# Patient Record
Sex: FEMALE | Race: WHITE | NOT HISPANIC OR LATINO | Employment: FULL TIME | URBAN - NONMETROPOLITAN AREA
[De-identification: names, ages, dates, MRNs, and addresses within clinical notes are randomized per-mention and may not be internally consistent; named-entity substitution may affect disease eponyms.]

---

## 2017-06-15 ENCOUNTER — OFFICE VISIT (OUTPATIENT)
Dept: CARDIAC SURGERY | Facility: CLINIC | Age: 52
End: 2017-06-15

## 2017-06-15 DIAGNOSIS — Z90.2 S/P LOBECTOMY OF LUNG: Primary | ICD-10-CM

## 2017-06-15 PROCEDURE — 99212 OFFICE O/P EST SF 10 MIN: CPT | Performed by: THORACIC SURGERY (CARDIOTHORACIC VASCULAR SURGERY)

## 2017-06-16 VITALS
OXYGEN SATURATION: 98 % | BODY MASS INDEX: 25.11 KG/M2 | SYSTOLIC BLOOD PRESSURE: 132 MMHG | DIASTOLIC BLOOD PRESSURE: 94 MMHG | HEART RATE: 79 BPM | WEIGHT: 160 LBS | HEIGHT: 67 IN

## 2017-06-16 NOTE — PROGRESS NOTES
06/15/2017  Patient Information  Dania Montero                                                                                          [address] [place] [place] [address]   1965  'PCP/Referring Physician'  Celine Cano,   [phone]  No ref. provider found    Chief Complaint   Patient presents with   • Follow-up     6mo f/u  w/ CXR       History of Present Illness:  Mrs. Montero returns today for follow up of her lung cancer.  Since last visit she has continued to see Dr. Echols.  She denies any new problems. She denies any pulmonary symptoms. She denies any weight loss or hemoptysis.    Patient Active Problem List   Diagnosis   • B-cell lymphoma   • Dyslipidemia   • Emphysema/COPD   • Dyspnea   • Lung mass   • GERD (gastroesophageal reflux disease)   • S/P lobectomy of lung     Past Medical History:   Diagnosis Date   • Dyslipidemia 9/28/2016   • Dyspnea 9/28/2016   • Emphysema/COPD 9/28/2016   • GERD (gastroesophageal reflux disease)    • History of bronchoscopy 05/20/2015    The RUL, bronchus intermedius, RLL, superior segment of the RLL and RML revealed no endobronchial lesions and minimal clear secretions. The SHER, lingula, LLL and superior segment of the LLL again revealed no endobronchial lesions and minimal clear secretions.   • History of chest x-ray 02/01/2016    Stable oblong opacity and atelectasis in the anterior aspect of the left perihilar region.   • History of chest x-ray 12/21/2015    Cardiac silhouette is within normal limits in size. Ellipsoid density still present in the lingular area, maybe slightly larger than seen on the prior film. Otherwise no significant change in the radiographic appearance of the chest.   • History of chest x-ray 11/25/2015    Cardiac silhouette is within normal limits in size. Right lung is clear. There is an ellipsoid density in the left midlung just lateral to, and below, the left hilum. In comparison to our prior film of 10/12/2015, this  area has improved in size although still present   • History of echocardiogram 05/05/2016    The estimated EF is 60-65%.  Mild to moderate TR. The RVSP is calculated at 44 mmHg.   • History of PFTs 05/12/2015    Moderate airway obstruction. NSC after BD. Normal diffusion.   • History of PFTs 03/24/2015    FEV1 is 55% predicted. Low vital capacity possibly due to restriction of lung volumes.   • Lung mass 9/28/2016     Past Surgical History:   Procedure Laterality Date   • BRONCHOSCOPY     • THORACOTOMY Left 05/03/2016    left thoracotomy,left upper lobectomy,lymph node sampling       Current Outpatient Prescriptions:   •  aspirin 81 MG EC tablet, Take 81 mg by mouth daily., Disp: , Rfl:   •  Calcium Carbonate Antacid (RA ANTACID PO), Take 1 tablet by mouth as needed., Disp: , Rfl:   •  Calcium Citrate-Vitamin D (CALCIUM + D PO), Take  by mouth., Disp: , Rfl:   •  metoprolol tartrate (LOPRESSOR) 25 MG tablet, Take 25 mg by mouth 2 (two) times a day., Disp: , Rfl:   •  pantoprazole (PROTONIX) 40 MG EC tablet, Take 1 tablet by mouth daily., Disp: 60 tablet, Rfl: 3  No Known Allergies  Social History     Social History   • Marital status:      Spouse name: N/A   • Number of children: 2   • Years of education: N/A     Occupational History   •       Social History Main Topics   • Smoking status: Former Smoker     Packs/day: 1.00     Years: 30.00     Types: Cigarettes     Quit date: 05/2000   • Smokeless tobacco: Never Used   • Alcohol use No   • Drug use: No   • Sexual activity: Not on file     Other Topics Concern   • Not on file     Social History Narrative     Family History   Problem Relation Age of Onset   • Other Mother      dyslipidemia   • Hypertension Mother    • Other Father      Dyslipidemia   • Emphysema Father    • Hypertension Father    • Heart defect Son    • Breast cancer Paternal Aunt    • Colon cancer Paternal Aunt    • Lung cancer Maternal Grandmother    • Lung cancer Maternal  "Grandfather      Review of Systems   Constitution: Negative for chills, fever, malaise/fatigue, night sweats and weight loss.   HENT: Negative for headaches, hearing loss, odynophagia and sore throat.    Cardiovascular: Negative for chest pain, dyspnea on exertion, leg swelling, orthopnea and palpitations.   Respiratory: Negative for cough and hemoptysis.    Endocrine: Positive for cold intolerance. Negative for heat intolerance, polydipsia, polyphagia and polyuria.   Hematologic/Lymphatic: Bruises/bleeds easily.   Skin: Negative for itching and rash.   Musculoskeletal: Negative for joint pain, joint swelling and myalgias.   Gastrointestinal: Negative for abdominal pain, constipation, diarrhea, hematemesis, hematochezia, melena, nausea and vomiting.   Genitourinary: Negative for dysuria, frequency and hematuria.   Neurological: Negative for focal weakness, numbness and seizures.   Psychiatric/Behavioral: Negative for suicidal ideas.   All other systems reviewed and are negative.    Vitals:    06/16/17 0901   BP: 132/94   BP Location: Left arm   Patient Position: Sitting   Pulse: 79   SpO2: 98%   Weight: 160 lb (72.6 kg)   Height: 67\" (170.2 cm)      Physical Exam  NECK:    No masses.  LUNGS:   Clear.  CARDIOVASCULAR:  Regular rhythm and rate.    Labs/Imaging:  Her chest x-ray looks satisfactory.    Assessment/Plan:  Mrs. Montero appears to be doing excellent in all regards.  We will get a CT scan in six months and then see her back in follow up.      Patient Active Problem List   Diagnosis   • B-cell lymphoma   • Dyslipidemia   • Emphysema/COPD   • Dyspnea   • Lung mass   • GERD (gastroesophageal reflux disease)   • S/P lobectomy of lung     Signed by: Vitor Kendall M.D.    6/15/2017    CC:  DO Jenifer Wyatt transcribing on behalf of Vitor Kendall MD dictating.   "

## 2017-07-07 RX ORDER — ASPIRIN 81 MG/1
TABLET ORAL
Qty: 90 TABLET | Refills: 3 | Status: SHIPPED | OUTPATIENT
Start: 2017-07-07

## 2017-12-04 ENCOUNTER — TELEPHONE (OUTPATIENT)
Dept: CARDIAC SURGERY | Facility: CLINIC | Age: 52
End: 2017-12-04

## 2018-02-15 ENCOUNTER — OFFICE VISIT (OUTPATIENT)
Dept: CARDIAC SURGERY | Facility: CLINIC | Age: 53
End: 2018-02-15

## 2018-02-15 DIAGNOSIS — Z90.2 S/P LOBECTOMY OF LUNG: Primary | ICD-10-CM

## 2018-02-15 PROCEDURE — 99212 OFFICE O/P EST SF 10 MIN: CPT | Performed by: THORACIC SURGERY (CARDIOTHORACIC VASCULAR SURGERY)

## 2018-02-16 VITALS
WEIGHT: 165 LBS | HEIGHT: 67 IN | SYSTOLIC BLOOD PRESSURE: 120 MMHG | HEART RATE: 78 BPM | BODY MASS INDEX: 25.9 KG/M2 | DIASTOLIC BLOOD PRESSURE: 90 MMHG

## 2018-02-16 NOTE — PROGRESS NOTES
02/15/2018  Patient Information  Dania Montero                                                                                          [address] [place]  [place] [address]   1965  'PCP/Referring Physician'  Celine Cano,   [phone]  No ref. provider found    Chief Complaint   Patient presents with   • Lung Cancer     6 month follow-up with CT chest       History of Present Illness:  Mrs. Montero returns today for follow up of her lung cancer.  She has been doing well since last visit.  She is seeing Dr. Echols on a regular basis.  She will be having a CT scan in April.  She denies cough, hemoptysis or other pulmonary symptoms.    Patient Active Problem List   Diagnosis   • B-cell lymphoma   • Dyslipidemia   • Emphysema/COPD   • Dyspnea   • Lung mass   • GERD (gastroesophageal reflux disease)   • S/P lobectomy of lung     Past Medical History:   Diagnosis Date   • Dyslipidemia 9/28/2016   • Dyspnea 9/28/2016   • Emphysema/COPD 9/28/2016   • GERD (gastroesophageal reflux disease)    • History of bronchoscopy 05/20/2015    The RUL, bronchus intermedius, RLL, superior segment of the RLL and RML revealed no endobronchial lesions and minimal clear secretions. The SHER, lingula, LLL and superior segment of the LLL again revealed no endobronchial lesions and minimal clear secretions.   • History of chest x-ray 02/01/2016    Stable oblong opacity and atelectasis in the anterior aspect of the left perihilar region.   • History of chest x-ray 12/21/2015    Cardiac silhouette is within normal limits in size. Ellipsoid density still present in the lingular area, maybe slightly larger than seen on the prior film. Otherwise no significant change in the radiographic appearance of the chest.   • History of chest x-ray 11/25/2015    Cardiac silhouette is within normal limits in size. Right lung is clear. There is an ellipsoid density in the left midlung just lateral to, and below, the left hilum. In  comparison to our prior film of 10/12/2015, this area has improved in size although still present   • History of echocardiogram 05/05/2016    The estimated EF is 60-65%.  Mild to moderate TR. The RVSP is calculated at 44 mmHg.   • History of PFTs 05/12/2015    Moderate airway obstruction. NSC after BD. Normal diffusion.   • History of PFTs 03/24/2015    FEV1 is 55% predicted. Low vital capacity possibly due to restriction of lung volumes.   • Lung mass 9/28/2016     Past Surgical History:   Procedure Laterality Date   • BRONCHOSCOPY     • THORACOTOMY Left 05/03/2016    left thoracotomy,left upper lobectomy,lymph node sampling       Current Outpatient Prescriptions:   •  aspirin 81 MG EC tablet, TAKE 1 TABLET BY MOUTH DAILY, Disp: 90 tablet, Rfl: 3  •  Calcium Citrate-Vitamin D (CALCIUM + D PO), Take  by mouth., Disp: , Rfl:   •  metoprolol tartrate (LOPRESSOR) 25 MG tablet, Take 25 mg by mouth 2 (two) times a day., Disp: , Rfl:   •  pantoprazole (PROTONIX) 40 MG EC tablet, Take 1 tablet by mouth daily., Disp: 60 tablet, Rfl: 3  •  Calcium Carbonate Antacid (RA ANTACID PO), Take 1 tablet by mouth as needed., Disp: , Rfl:   No Known Allergies  Social History     Social History   • Marital status:      Spouse name: N/A   • Number of children: 2   • Years of education: N/A     Occupational History   •       Social History Main Topics   • Smoking status: Former Smoker     Packs/day: 1.00     Years: 30.00     Types: Cigarettes     Quit date: 05/2000   • Smokeless tobacco: Never Used   • Alcohol use No   • Drug use: No   • Sexual activity: Not on file     Other Topics Concern   • Not on file     Social History Narrative     Family History   Problem Relation Age of Onset   • Other Mother      dyslipidemia   • Hypertension Mother    • Other Father      Dyslipidemia   • Emphysema Father    • Hypertension Father    • Heart defect Son    • Breast cancer Paternal Aunt    • Colon cancer Paternal Aunt    • Lung  "cancer Maternal Grandmother    • Lung cancer Maternal Grandfather      Review of Systems   Constitution: Negative for chills, fever, malaise/fatigue, night sweats and weight loss.   HENT: Negative for hearing loss, odynophagia and sore throat.    Cardiovascular: Positive for palpitations. Negative for chest pain, dyspnea on exertion, leg swelling and orthopnea.   Respiratory: Negative for cough and hemoptysis.    Endocrine: Negative for cold intolerance, heat intolerance, polydipsia, polyphagia and polyuria.   Hematologic/Lymphatic: Does not bruise/bleed easily.   Skin: Negative for itching and rash.   Musculoskeletal: Positive for joint pain and muscle cramps. Negative for joint swelling and myalgias.   Gastrointestinal: Negative for abdominal pain, constipation, diarrhea, hematemesis, hematochezia, melena, nausea and vomiting.   Genitourinary: Negative for dysuria, frequency and hematuria.   Neurological: Positive for light-headedness. Negative for focal weakness, headaches, numbness and seizures.   Psychiatric/Behavioral: Negative for suicidal ideas.   All other systems reviewed and are negative.    Vitals:    02/16/18 0909   BP: 120/90   BP Location: Left arm   Patient Position: Sitting   Pulse: 78   Weight: 74.8 kg (165 lb)   Height: 170.2 cm (67\")      Physical Exam  NECK:  No masses.  LUNGS:  Decreased in the bases without rales or rhonchi.  CARDIOVASCULAR:  Regular rhythm and rate without murmur, rub or gallop.  There are no carotid bruits.     Assessment/Plan:  I obtained and reviewed her x-rays.  She overall appears to be doing well in all regards with no evidence of recurrence.  I will see her back in one year since she is seeing Dr. Echols on a regular basis.      Patient Active Problem List   Diagnosis   • B-cell lymphoma   • Dyslipidemia   • Emphysema/COPD   • Dyspnea   • Lung mass   • GERD (gastroesophageal reflux disease)   • S/P lobectomy of lung     Signed by: Vitor Kendall, " M.D.    2/15/2018    CC:  DO Jenifer Wyatt transcribing on behalf of Vitor eKndall MD dictating.

## 2021-10-04 ENCOUNTER — OFFICE VISIT (OUTPATIENT)
Dept: CARDIAC SURGERY | Facility: CLINIC | Age: 56
End: 2021-10-04

## 2021-10-04 VITALS
WEIGHT: 134 LBS | HEART RATE: 75 BPM | DIASTOLIC BLOOD PRESSURE: 74 MMHG | TEMPERATURE: 98.1 F | OXYGEN SATURATION: 98 % | HEIGHT: 67 IN | BODY MASS INDEX: 21.03 KG/M2 | SYSTOLIC BLOOD PRESSURE: 124 MMHG

## 2021-10-04 DIAGNOSIS — C34.90 MALIGNANT NEOPLASM OF BRONCHUS AND LUNG (HCC): Primary | ICD-10-CM

## 2021-10-04 PROCEDURE — 99204 OFFICE O/P NEW MOD 45 MIN: CPT | Performed by: THORACIC SURGERY (CARDIOTHORACIC VASCULAR SURGERY)

## 2021-10-04 NOTE — PROGRESS NOTES
10/04/2021  Patient Information  Dania [address] [place] [address]   1965  'PCP/Referring Physician'  Celine Cano DO [facility]  [phone]  No ref. provider found    Chief Complaint   Patient presents with   • Follow-up     [facility] pt of yours last seen in 2018, referred back by Dr. Val Verma, of the same dx, lung cancer. Pt states that she is having SOB w/ exertion, some fatigue and tenderness still occurs at the left breast from hx of surgery.  pt states that she is nervous today.        History of Present Illness:   The patient is a 56-year-old female who has been referred back for lung cancer. She has a history in 2016, of having a lobectomy for bronchogenic carcinoma. She has, recently, had a chest x-ray which was slightly abnormal.  She has been scheduled for a CT scan.  She has been followed by Dr. Echols on a yearly basis.  She denies any significant weight loss.  She denies hemoptysis or pulmonary symptoms.      Patient Active Problem List   Diagnosis   • B-cell lymphoma (HCC)   • Dyslipidemia   • Emphysema/COPD (HCC)   • Dyspnea   • Lung mass   • GERD (gastroesophageal reflux disease)   • S/P lobectomy of lung   • Malignant neoplasm of bronchus and lung (HCC)     Past Medical History:   Diagnosis Date   • Anxiety    • Cancer (HCC)    • Dyslipidemia 9/28/2016   • Dyspnea 9/28/2016   • Emphysema/COPD (HCC) 9/28/2016   • GERD (gastroesophageal reflux disease)    • History of bronchoscopy 05/20/2015    The RUL, bronchus intermedius, RLL, superior segment of the RLL and RML revealed no endobronchial lesions and minimal clear secretions. The SHER, lingula, LLL and superior segment of the LLL again revealed no endobronchial lesions and minimal clear secretions.   • History of chest x-ray 02/01/2016    Stable oblong opacity and atelectasis in the anterior aspect of the left perihilar region.    • History of chest x-ray 12/21/2015    Cardiac silhouette is within normal limits in size. Ellipsoid density still present in the lingular area, maybe slightly larger than seen on the prior film. Otherwise no significant change in the radiographic appearance of the chest.   • History of chest x-ray 11/25/2015    Cardiac silhouette is within normal limits in size. Right lung is clear. There is an ellipsoid density in the left midlung just lateral to, and below, the left hilum. In comparison to our prior film of 10/12/2015, this area has improved in size although still present   • History of echocardiogram 05/05/2016    The estimated EF is 60-65%.  Mild to moderate TR. The RVSP is calculated at 44 mmHg.   • History of PFTs 05/12/2015    Moderate airway obstruction. NSC after BD. Normal diffusion.   • History of PFTs 03/24/2015    FEV1 is 55% predicted. Low vital capacity possibly due to restriction of lung volumes.   • Lung mass 9/28/2016     Past Surgical History:   Procedure Laterality Date   • BRONCHOSCOPY     • THORACOTOMY Left 05/03/2016    left thoracotomy,left upper lobectomy,lymph node sampling       Current Outpatient Medications:   •  aspirin 81 MG EC tablet, TAKE 1 TABLET BY MOUTH DAILY, Disp: 90 tablet, Rfl: 3  •  metoprolol tartrate (LOPRESSOR) 25 MG tablet, Take 25 mg by mouth 2 (two) times a day., Disp: , Rfl:   •  pantoprazole (PROTONIX) 40 MG EC tablet, Take 1 tablet by mouth daily., Disp: 60 tablet, Rfl: 3  •  vitamin D3 125 MCG (5000 UT) capsule capsule, Take 5,000 Units by mouth Daily., Disp: , Rfl:   •  Calcium Carbonate Antacid (RA ANTACID PO), Take 1 tablet by mouth as needed., Disp: , Rfl:   •  Calcium Citrate-Vitamin D (CALCIUM + D PO), Take  by mouth., Disp: , Rfl:   No Known Allergies  Social History     Socioeconomic History   • Marital status:      Spouse name: Not on file   • Number of children: 2   • Years of education: Not on file   • Highest education level: Not on file    Tobacco Use   • Smoking status: Former Smoker     Packs/day: 1.00     Years: 30.00     Pack years: 30.00     Types: Cigarettes     Quit date: 2016     Years since quittin.4   • Smokeless tobacco: Never Used   Vaping Use   • Vaping Use: Never used   Substance and Sexual Activity   • Alcohol use: No   • Drug use: No   • Sexual activity: Defer     Family History   Problem Relation Age of Onset   • Other Mother         dyslipidemia   • Hypertension Mother    • Other Father         Dyslipidemia   • Emphysema Father    • Hypertension Father    • Heart defect Son    • Breast cancer Paternal Aunt    • Colon cancer Paternal Aunt    • Lung cancer Maternal Grandmother    • Lung cancer Maternal Grandfather      Review of Systems   Constitutional: Positive for malaise/fatigue (stress related ). Negative for chills, fever, night sweats and weight loss.   HENT: Negative for congestion, hearing loss, nosebleeds and odynophagia.    Cardiovascular: Positive for dyspnea on exertion and palpitations (from time to time). Negative for chest pain, claudication, leg swelling, orthopnea and syncope.   Respiratory: Negative for cough, hemoptysis, shortness of breath and wheezing.    Endocrine: Negative for cold intolerance, heat intolerance, polydipsia, polyphagia and polyuria.   Hematologic/Lymphatic: Bruises/bleeds easily.   Skin: Positive for poor wound healing. Negative for itching and rash.   Musculoskeletal: Negative for arthritis, back pain, joint pain, joint swelling and myalgias.   Gastrointestinal: Negative for abdominal pain, constipation, diarrhea, hematemesis, melena, nausea and vomiting.   Genitourinary: Negative for dysuria, frequency, hematuria, nocturia and urgency.   Neurological: Negative for dizziness, light-headedness, loss of balance and numbness.   Psychiatric/Behavioral: Negative for depression and suicidal ideas. The patient is nervous/anxious.    Allergic/Immunologic: Negative for environmental allergies  "and HIV exposure.     Vitals:    10/04/21 1225   BP: 124/74   Pulse: 75   Temp: 98.1 °F (36.7 °C)   SpO2: 98%   Weight: 60.8 kg (134 lb)   Height: 170.2 cm (67\")      Physical Exam  Vitals and nursing note reviewed.   Constitutional:       General: She is not in acute distress.     Appearance: She is well-developed.   HENT:      Head: Normocephalic.   Eyes:      Conjunctiva/sclera: Conjunctivae normal.      Pupils: Pupils are equal, round, and reactive to light.   Neck:      Thyroid: No thyroid mass or thyromegaly.   Cardiovascular:      Rate and Rhythm: Normal rate.      Heart sounds: No murmur heard.   No friction rub. No gallop.    Pulmonary:      Breath sounds: No wheezing, rhonchi or rales.   Abdominal:      General: Bowel sounds are normal. There is no distension.      Palpations: Abdomen is soft. There is no mass.      Tenderness: There is no abdominal tenderness.   Musculoskeletal:         General: No deformity. Normal range of motion.      Cervical back: Normal range of motion.   Skin:     Findings: No petechiae.      Nails: There is no clubbing.   Neurological:      Mental Status: She is oriented to person, place, and time.      Cranial Nerves: No cranial nerve deficit.      Sensory: No sensory deficit.   Psychiatric:         Behavior: Behavior normal.         The ROS, past medical history, surgical history, family history, social history and vitals were reviewed by myself and corrected as needed.      Labs/Imaging:  I have obtained and reviewed the medical records from Dr. Verma, including the chest x-ray which was abnormal.    Assessment/Plan:   The patient is a 56-year-old  female who is being referred back for lung cancer. She has a history in 2016, of having a lobectomy done for bronchogenic carcinoma.  She received chemotherapy by Dr. Echols.  I have not seen her since, approximately, 2018.  She had an abnormal chest x-ray and is scheduled for a CT scan on October 12.  At this point, I do " not think the chest x-ray is grossly abnormal.  I will await the results of the CT scan to make further recommendations.  I have answered all of her questions. I would like to thank you for this consultation.    Patient Active Problem List   Diagnosis   • B-cell lymphoma (HCC)   • Dyslipidemia   • Emphysema/COPD (HCC)   • Dyspnea   • Lung mass   • GERD (gastroesophageal reflux disease)   • S/P lobectomy of lung   • Malignant neoplasm of bronchus and lung (HCC)       CC: DO Val Wyatt MD Regina Fugate editing for Vitor Kendall MD      I, Vitor Kendall MD, have read and agree with the editing done by Chapis Austin, .

## 2021-10-05 PROBLEM — C34.90 MALIGNANT NEOPLASM OF BRONCHUS AND LUNG (HCC): Status: ACTIVE | Noted: 2021-10-05

## 2021-10-11 ENCOUNTER — PATIENT ROUNDING (BHMG ONLY) (OUTPATIENT)
Dept: CARDIAC SURGERY | Facility: CLINIC | Age: 56
End: 2021-10-11

## 2021-10-11 NOTE — PROGRESS NOTES
October 11, 2021    Hello, may I speak with Dania Montero?    My name is Ali     I am  with [facility]  [address]  [place], located within [facility] [address] [phone].    Before we get started may I verify your date of birth? 1965    I am calling to officially welcome you to our practice and ask about your recent visit. Is this a good time to talk? Yes    Tell me about your visit with us. What things went well? Enjoyed her visit.       We're always looking for ways to make our patients' experiences even better. Do you have recommendations on ways we may improve?  No    Overall were you satisfied with your first visit to our practice? No     I appreciate you taking the time to speak with me today. Is there anything else I can do for you? No      Thank you, and have a great day.

## 2021-11-01 ENCOUNTER — OFFICE VISIT (OUTPATIENT)
Dept: CARDIAC SURGERY | Facility: CLINIC | Age: 56
End: 2021-11-01

## 2021-11-01 VITALS
TEMPERATURE: 97.9 F | BODY MASS INDEX: 20.4 KG/M2 | DIASTOLIC BLOOD PRESSURE: 87 MMHG | SYSTOLIC BLOOD PRESSURE: 156 MMHG | HEART RATE: 74 BPM | HEIGHT: 67 IN | OXYGEN SATURATION: 98 % | WEIGHT: 130 LBS

## 2021-11-01 DIAGNOSIS — C34.90 MALIGNANT NEOPLASM OF BRONCHUS AND LUNG (HCC): Primary | ICD-10-CM

## 2021-11-01 PROCEDURE — 99214 OFFICE O/P EST MOD 30 MIN: CPT | Performed by: NURSE PRACTITIONER

## 2021-11-01 NOTE — PROGRESS NOTES
[facility] Cardiothoracic Surgery Office Follow Up Note     Date of Encounter: 2021     Name: Dania Montero  : 1965     Referred By: No ref. provider found  PCP: Val Verma DO    Chief Complaint:    Chief Complaint   Patient presents with   • Follow-up     3 week follow up to discuss CT chest results.   • Lung Cancer       Subjective      History of Present Illness:    Dania Montero is a 56 y.o. female former smoker, with a history of HLD, B Cell lymphoma, emphysema/COPD, SHER lobectomy/thora for bronchogenic carcinoma with Dr. Kendall May 2016. She also received chemotherapy with Dr. Echols at that time. Patient was last seen in clinic 10/5/21 by Dr. Kendall for re-referral for abnormal chest xray and concern for lung cancer. Patient reports she asked her PCP to perform a chest xray because she did not have a CT last year. Patient chest xray showed concern for small opacity obscuring the left heart border.  At time of visit, patient had not had a CT scan performed so Dr. Kendall wanted to see her back to review results before proceeding with recommendations. Patient has been doing well, denies any SOA, weight loss, lymphadenopathy, cough or hemoptysis. She follows closely with her PCP and with oncology Dr. Cross. She has had yearly CTs but did not have one in  due to covid. Patient continues to smoke.     Review of Systems:  Review of Systems   Constitutional: Negative. Negative for chills, fever, malaise/fatigue, night sweats and weight loss.   HENT: Negative.  Negative for hearing loss, odynophagia and sore throat.    Cardiovascular: Positive for dyspnea on exertion. Negative for chest pain, leg swelling, orthopnea and palpitations.   Respiratory: Negative.  Negative for cough and hemoptysis.    Endocrine: Negative for cold intolerance, heat intolerance, polydipsia, polyphagia and polyuria.   Hematologic/Lymphatic: Bruises/bleeds easily.   Skin: Negative.  Negative for  itching and rash.   Musculoskeletal: Negative.  Negative for joint pain, joint swelling and myalgias.   Gastrointestinal: Negative.  Negative for abdominal pain, constipation, diarrhea, hematemesis, hematochezia, melena, nausea and vomiting.   Genitourinary: Negative.  Negative for dysuria, frequency and hematuria.   Neurological: Negative for focal weakness, headaches, numbness and seizures.   Psychiatric/Behavioral: Negative.  Negative for suicidal ideas.   All other systems reviewed and are negative.      I have reviewed the following portions of the patient's history: allergies, current medications, past family history, past medical history, past social history, past surgical history, problem list and ROS and confirm it's accurate.    Allergies:  No Known Allergies    Medications:      Current Outpatient Medications:   •  aspirin 81 MG EC tablet, TAKE 1 TABLET BY MOUTH DAILY, Disp: 90 tablet, Rfl: 3  •  Calcium Carbonate Antacid (RA ANTACID PO), Take 1 tablet by mouth as needed., Disp: , Rfl:   •  Calcium Citrate-Vitamin D (CALCIUM + D PO), Take  by mouth., Disp: , Rfl:   •  metoprolol tartrate (LOPRESSOR) 25 MG tablet, Take 25 mg by mouth 2 (two) times a day., Disp: , Rfl:   •  pantoprazole (PROTONIX) 40 MG EC tablet, Take 1 tablet by mouth daily., Disp: 60 tablet, Rfl: 3  •  vitamin D3 125 MCG (5000 UT) capsule capsule, Take 5,000 Units by mouth Daily., Disp: , Rfl:     History:   Past Medical History:   Diagnosis Date   • Anxiety    • Cancer (HCC)    • Dyslipidemia 9/28/2016   • Dyspnea 9/28/2016   • Emphysema/COPD (HCC) 9/28/2016   • GERD (gastroesophageal reflux disease)    • History of bronchoscopy 05/20/2015    The RUL, bronchus intermedius, RLL, superior segment of the RLL and RML revealed no endobronchial lesions and minimal clear secretions. The SHER, lingula, LLL and superior segment of the LLL again revealed no endobronchial lesions and minimal clear secretions.   • History of chest x-ray 02/01/2016     Stable oblong opacity and atelectasis in the anterior aspect of the left perihilar region.   • History of chest x-ray 2015    Cardiac silhouette is within normal limits in size. Ellipsoid density still present in the lingular area, maybe slightly larger than seen on the prior film. Otherwise no significant change in the radiographic appearance of the chest.   • History of chest x-ray 2015    Cardiac silhouette is within normal limits in size. Right lung is clear. There is an ellipsoid density in the left midlung just lateral to, and below, the left hilum. In comparison to our prior film of 10/12/2015, this area has improved in size although still present   • History of echocardiogram 2016    The estimated EF is 60-65%.  Mild to moderate TR. The RVSP is calculated at 44 mmHg.   • History of PFTs 2015    Moderate airway obstruction. NSC after BD. Normal diffusion.   • History of PFTs 2015    FEV1 is 55% predicted. Low vital capacity possibly due to restriction of lung volumes.   • Lung mass 2016       Past Surgical History:   Procedure Laterality Date   • BRONCHOSCOPY     • THORACOTOMY Left 2016    left thoracotomy,left upper lobectomy,lymph node sampling       Social History     Socioeconomic History   • Marital status:    • Number of children: 2   Tobacco Use   • Smoking status: Former Smoker     Packs/day: 1.00     Years: 30.00     Pack years: 30.00     Types: Cigarettes     Quit date: 2016     Years since quittin.5   • Smokeless tobacco: Never Used   Vaping Use   • Vaping Use: Never used   Substance and Sexual Activity   • Alcohol use: No   • Drug use: No   • Sexual activity: Defer        Family History   Problem Relation Age of Onset   • Other Mother         dyslipidemia   • Hypertension Mother    • Other Father         Dyslipidemia   • Emphysema Father    • Hypertension Father    • Heart defect Son    • Breast cancer Paternal Aunt    • Colon cancer  "Paternal Aunt    • Lung cancer Maternal Grandmother    • Lung cancer Maternal Grandfather        Objective     Physical Exam:  Vitals:    11/01/21 1224   BP: 156/87   BP Location: Right arm   Patient Position: Sitting   Pulse: 74   Temp: 97.9 °F (36.6 °C)   SpO2: 98%   Weight: 59 kg (130 lb)   Height: 170.2 cm (67\")      Body mass index is 20.36 kg/m².    Physical Exam  Vitals and nursing note reviewed.   Constitutional:       Appearance: Normal appearance. She is well-developed.   HENT:      Head: Normocephalic and atraumatic.   Eyes:      Pupils: Pupils are equal, round, and reactive to light.   Neck:      Vascular: No carotid bruit.   Cardiovascular:      Rate and Rhythm: Normal rate and regular rhythm.      Pulses: Normal pulses.      Heart sounds: Normal heart sounds, S1 normal and S2 normal. No murmur heard.      Pulmonary:      Effort: Pulmonary effort is normal.      Breath sounds: Examination of the right-lower field reveals decreased breath sounds. Examination of the left-lower field reveals decreased breath sounds. Decreased breath sounds present.   Abdominal:      Palpations: Abdomen is soft.   Musculoskeletal:         General: No swelling.      Cervical back: Neck supple.      Right lower leg: No edema.      Left lower leg: No edema.   Skin:     General: Skin is warm and dry.      Capillary Refill: Capillary refill takes less than 2 seconds.      Findings: No bruising.   Neurological:      General: No focal deficit present.      Mental Status: She is alert and oriented to person, place, and time. Mental status is at baseline.      GCS: GCS eye subscore is 4. GCS verbal subscore is 5. GCS motor subscore is 6.      Motor: Motor function is intact.      Coordination: Coordination is intact.      Gait: Gait is intact.   Psychiatric:         Mood and Affect: Mood normal.         Speech: Speech normal.         Behavior: Behavior normal. Behavior is cooperative.         Cognition and Memory: Cognition normal. "         Imaging/Labs:    CT Chest 10/12/21  Results: Lungs show emphysema with biapical scarring present. No focal consolidation, pleural effusion, or pneumothorax is seen. This is atheromatous calcified plaquing in the vasculature. There is no evidence of thoracic adenopathy. There are degenerative changes in the spine. Thyroid gland and esophagus appear intact. Impression: No acute process. COPD/emphysema with chronic biapical scarring similar to prior exam.     CXR 8/24/2021:  Findings: Postsurgical changes noted left lung.  Small opacity seen obscuring the left heart border.  No gross pleural effusion seen.  Impression: Small opacity seen obscuring the left heart border.  Please compare with prior chest x-rays.  If clinically warranted CT chest may be beneficial for further differentiation.    CT Thorax 10/24/2017:  Impression: No acute process.  Small amount of infiltrate or atelectasis in the right midlung and lingular segment.  No lymphadenopathy.  Emphysema.    Assessment / Plan      Assessment / Plan:  Diagnoses and all orders for this visit:    1. Malignant neoplasm of bronchus and lung (HCC) (Primary)       1. Hx of malignant neoplasm of lung and SHER lobectomy/thora for bronchogenic carcinoma with Dr. Kendall May 2016: Patient doing well without any SOA, weight loss, lymphadenopathy, cough or hemoptysis. Reviewed results of CT scan with patient which did not reveal any acute process or small opacity as seen on her previous chest xray. Patient was relieved to hear results as she was worried about findings on chest xray. Discussed importance of yearly surveillance CT scans for lung cancer, which patient will continue to get with her oncologist Dr. Cross. Discussed importance of smoking cessation with patient. Will plan to see patient back on an as needed basis.       Follow Up:   Return if symptoms worsen or fail to improve.   Or sooner for any further concerns or worsening sign and symptoms. If unable  to reach us in the office please dial 911 or go to the nearest emergency department.      Helena MICHELE  [facility] Cardiothoracic Surgery    Time Spent: I spent 31 minutes caring for Dania on this date of service. This time includes time spent by me in the following activities: preparing for the visit, reviewing tests, obtaining and/or reviewing a separately obtained history, performing a medically appropriate examination and/or evaluation, counseling and educating the patient/family/caregiver, referring and communicating with other health care professionals, documenting information in the medical record and independently interpreting results and communicating that information with the patient/family/caregiver.

## 2021-11-02 DIAGNOSIS — Z00.6 EXAMINATION FOR NORMAL COMPARISON FOR CLINICAL RESEARCH: Primary | ICD-10-CM
